# Patient Record
Sex: MALE | Race: WHITE | NOT HISPANIC OR LATINO | URBAN - METROPOLITAN AREA
[De-identification: names, ages, dates, MRNs, and addresses within clinical notes are randomized per-mention and may not be internally consistent; named-entity substitution may affect disease eponyms.]

---

## 2021-02-03 ENCOUNTER — OFFICE VISIT (OUTPATIENT)
Dept: URBAN - METROPOLITAN AREA CLINIC 70 | Facility: CLINIC | Age: 86
End: 2021-02-03
Payer: MEDICARE

## 2021-02-03 ENCOUNTER — WEB ENCOUNTER (OUTPATIENT)
Dept: URBAN - METROPOLITAN AREA CLINIC 70 | Facility: CLINIC | Age: 86
End: 2021-02-03

## 2021-02-03 VITALS
HEIGHT: 71 IN | BODY MASS INDEX: 25.2 KG/M2 | DIASTOLIC BLOOD PRESSURE: 97 MMHG | SYSTOLIC BLOOD PRESSURE: 188 MMHG | WEIGHT: 180 LBS | HEART RATE: 87 BPM | TEMPERATURE: 97 F

## 2021-02-03 DIAGNOSIS — R19.7 DIARRHEA: ICD-10-CM

## 2021-02-03 PROCEDURE — G9903 PT SCRN TBCO ID AS NON USER: HCPCS | Performed by: REGISTERED NURSE

## 2021-02-03 PROCEDURE — 1036F TOBACCO NON-USER: CPT | Performed by: REGISTERED NURSE

## 2021-02-03 PROCEDURE — G8427 DOCREV CUR MEDS BY ELIG CLIN: HCPCS | Performed by: REGISTERED NURSE

## 2021-02-03 PROCEDURE — G8420 CALC BMI NORM PARAMETERS: HCPCS | Performed by: REGISTERED NURSE

## 2021-02-03 PROCEDURE — 99214 OFFICE O/P EST MOD 30 MIN: CPT | Performed by: REGISTERED NURSE

## 2021-02-03 PROCEDURE — G8482 FLU IMMUNIZE ORDER/ADMIN: HCPCS | Performed by: REGISTERED NURSE

## 2021-02-03 RX ORDER — TAMSULOSIN HYDROCHLORIDE 0.4 MG/1
CAPSULE ORAL
Qty: 0 | Refills: 0 | Status: ACTIVE | COMMUNITY
Start: 1900-01-01

## 2021-02-03 RX ORDER — LISINOPRIL AND HYDROCHLOROTHIAZIDE TABLETS 20; 25 MG/1; MG/1
TABLET ORAL
Qty: 0 | Refills: 0 | Status: ACTIVE | COMMUNITY
Start: 1900-01-01

## 2021-02-03 RX ORDER — GABAPENTIN 300 MG/1
1 CAPSULE CAPSULE ORAL ONCE A DAY
Status: ACTIVE | COMMUNITY

## 2021-02-03 RX ORDER — TIMOLOL 5.12 MG/ML
SOLUTION/ DROPS OPHTHALMIC
Qty: 0 | Refills: 0 | Status: ACTIVE | COMMUNITY
Start: 1900-01-01

## 2021-02-03 RX ORDER — TRAMADOL HYDROCHLORIDE 50 MG/1
1 TABLET AS NEEDED TABLET, FILM COATED ORAL ONCE A DAY
Status: ACTIVE | COMMUNITY

## 2021-02-03 NOTE — HPI-TODAY'S VISIT:
Pt c/o diarrhea. Diarrhea has been present for 1 year. They have been having 6-8 BMs/day. Stool is watery. Associated symptoms are urgency. Workup has not been done. He was prescribe hyoscamine 2 weeks ago which has helped some. Still having 3-4 BMs/day. Colonoscopy was done 8/14/15 which was normal. Random biopsies were not done.

## 2021-02-05 ENCOUNTER — LAB OUTSIDE AN ENCOUNTER (OUTPATIENT)
Dept: URBAN - METROPOLITAN AREA CLINIC 70 | Facility: CLINIC | Age: 86
End: 2021-02-05

## 2021-02-07 LAB — C DIFFICILE TOXINS A+B, EIA: NEGATIVE

## 2021-02-13 LAB
C DIFFICILE TOXINS A+B, EIA: NEGATIVE
C-REACTIVE PROTEIN, QUANT: 30
CAMPYLOBACTER CULTURE: (no result)
CRYPTOSPORIDIUM EIA: NEGATIVE
E COLI SHIGA TOXIN EIA: NEGATIVE
ENDOMYSIAL ANTIBODY IGA: NEGATIVE
GIARDIA LAMBLIA AG, EIA: NEGATIVE
IMMUNOGLOBULIN A, QN, SERUM: 247
Lab: (no result)
OVA + PARASITE EXAM: (no result)
PANCREATIC ELASTASE, FECAL: 167
SALMONELLA/SHIGELLA SCREEN: (no result)
T-TRANSGLUTAMINASE (TTG) IGA: <2
T4,FREE(DIRECT): 1.02
TSH: 1.49
WHITE BLOOD CELLS (WBC), STOOL: (no result)

## 2021-02-24 ENCOUNTER — TELEPHONE ENCOUNTER (OUTPATIENT)
Dept: URBAN - METROPOLITAN AREA CLINIC 92 | Facility: CLINIC | Age: 86
End: 2021-02-24

## 2021-02-24 RX ORDER — PANCRELIPASE 24000; 76000; 120000 [USP'U]/1; [USP'U]/1; [USP'U]/1
AS DIRECTED CAPSULE, DELAYED RELEASE PELLETS ORAL
Qty: 240 | Refills: 2 | OUTPATIENT
Start: 2021-02-24 | End: 2021-05-25

## 2021-03-03 ENCOUNTER — OFFICE VISIT (OUTPATIENT)
Dept: URBAN - METROPOLITAN AREA CLINIC 70 | Facility: CLINIC | Age: 86
End: 2021-03-03
Payer: MEDICARE

## 2021-03-03 ENCOUNTER — WEB ENCOUNTER (OUTPATIENT)
Dept: URBAN - METROPOLITAN AREA CLINIC 70 | Facility: CLINIC | Age: 86
End: 2021-03-03

## 2021-03-03 VITALS
BODY MASS INDEX: 173 KG/M2 | HEART RATE: 90 BPM | TEMPERATURE: 96.6 F | SYSTOLIC BLOOD PRESSURE: 105 MMHG | DIASTOLIC BLOOD PRESSURE: 67 MMHG | HEIGHT: 71 IN

## 2021-03-03 DIAGNOSIS — K86.81 EXOCRINE PANCREATIC INSUFFICIENCY: ICD-10-CM

## 2021-03-03 PROCEDURE — 99213 OFFICE O/P EST LOW 20 MIN: CPT | Performed by: REGISTERED NURSE

## 2021-03-03 RX ORDER — LISINOPRIL AND HYDROCHLOROTHIAZIDE TABLETS 20; 25 MG/1; MG/1
TABLET ORAL
Qty: 0 | Refills: 0 | Status: ACTIVE | COMMUNITY
Start: 1900-01-01

## 2021-03-03 RX ORDER — GABAPENTIN 300 MG/1
1 CAPSULE CAPSULE ORAL ONCE A DAY
Status: ACTIVE | COMMUNITY

## 2021-03-03 RX ORDER — TAMSULOSIN HYDROCHLORIDE 0.4 MG/1
CAPSULE ORAL
Qty: 0 | Refills: 0 | Status: ACTIVE | COMMUNITY
Start: 1900-01-01

## 2021-03-03 RX ORDER — PANCRELIPASE 24000; 76000; 120000 [USP'U]/1; [USP'U]/1; [USP'U]/1
AS DIRECTED CAPSULE, DELAYED RELEASE PELLETS ORAL
Qty: 240 | Refills: 2 | Status: ACTIVE | COMMUNITY
Start: 2021-02-24 | End: 2021-05-25

## 2021-03-03 RX ORDER — PANCRELIPASE 24000; 76000; 120000 [USP'U]/1; [USP'U]/1; [USP'U]/1
AS DIRECTED CAPSULE, DELAYED RELEASE PELLETS ORAL
Qty: 240 CAPSULE | Refills: 11 | OUTPATIENT
Start: 2021-02-24 | End: 2021-05-25

## 2021-03-03 RX ORDER — TRAMADOL HYDROCHLORIDE 50 MG/1
1 TABLET AS NEEDED TABLET, FILM COATED ORAL ONCE A DAY
Status: ACTIVE | COMMUNITY

## 2021-03-03 RX ORDER — TIMOLOL 5.12 MG/ML
SOLUTION/ DROPS OPHTHALMIC
Qty: 0 | Refills: 0 | Status: ACTIVE | COMMUNITY
Start: 1900-01-01

## 2021-03-03 RX ORDER — FINASTERIDE 5 MG/1
1 TABLET TABLET, FILM COATED ORAL ONCE A DAY
Status: ACTIVE | COMMUNITY

## 2021-03-03 NOTE — HPI-TODAY'S VISIT:
Note from OV 2/3/21: Pt c/o diarrhea. Diarrhea has been present for 1 year. They have been having 6-8 BMs/day. Stool is watery. Associated symptoms are urgency. Workup has not been done. He was prescribe hyoscamine 2 weeks ago which has helped some. Still having 3-4 BMs/day. Colonoscopy was done 8/14/15 which was normal. Random biopsies were not done. TODAY: Stool studies were negative for infectious process but fecal elastase was low. Rx for Creon sent to pharmacy. Diarrhea has resolved after starting Creon. He is feeling much better.

## 2021-10-04 ENCOUNTER — OFFICE VISIT (OUTPATIENT)
Dept: URBAN - METROPOLITAN AREA CLINIC 70 | Facility: CLINIC | Age: 86
End: 2021-10-04
Payer: MEDICARE

## 2021-10-04 ENCOUNTER — WEB ENCOUNTER (OUTPATIENT)
Dept: URBAN - METROPOLITAN AREA CLINIC 70 | Facility: CLINIC | Age: 86
End: 2021-10-04

## 2021-10-04 VITALS
HEIGHT: 71 IN | WEIGHT: 167.2 LBS | SYSTOLIC BLOOD PRESSURE: 162 MMHG | DIASTOLIC BLOOD PRESSURE: 89 MMHG | TEMPERATURE: 97.9 F | HEART RATE: 108 BPM | BODY MASS INDEX: 23.41 KG/M2

## 2021-10-04 DIAGNOSIS — A04.9 BACTERIAL INTESTINAL INFECTION, UNSPECIFIED: ICD-10-CM

## 2021-10-04 DIAGNOSIS — K86.81 EXOCRINE PANCREATIC INSUFFICIENCY: ICD-10-CM

## 2021-10-04 PROCEDURE — 99214 OFFICE O/P EST MOD 30 MIN: CPT | Performed by: INTERNAL MEDICINE

## 2021-10-04 RX ORDER — ESCITALOPRAM OXALATE 10 MG/1
TABLET, FILM COATED ORAL
Qty: 30 | Status: ACTIVE | COMMUNITY

## 2021-10-04 RX ORDER — LISINOPRIL AND HYDROCHLOROTHIAZIDE TABLETS 20; 25 MG/1; MG/1
TABLET ORAL
Qty: 0 | Refills: 0 | Status: ACTIVE | COMMUNITY
Start: 1900-01-01

## 2021-10-04 RX ORDER — PANCRELIPASE 36000; 180000; 114000 [USP'U]/1; [USP'U]/1; [USP'U]/1
AS DIRECTED CAPSULE, DELAYED RELEASE PELLETS ORAL
Qty: 250 | Refills: 3 | OUTPATIENT
Start: 2021-10-04 | End: 2022-01-31

## 2021-10-04 RX ORDER — GABAPENTIN 300 MG/1
1 CAPSULE CAPSULE ORAL ONCE A DAY
Status: ACTIVE | COMMUNITY

## 2021-10-04 RX ORDER — TRAMADOL HYDROCHLORIDE 50 MG/1
1 TABLET AS NEEDED TABLET, FILM COATED ORAL ONCE A DAY
Status: ACTIVE | COMMUNITY

## 2021-10-04 RX ORDER — TAMSULOSIN HYDROCHLORIDE 0.4 MG/1
CAPSULE ORAL
Qty: 0 | Refills: 0 | Status: ACTIVE | COMMUNITY
Start: 1900-01-01

## 2021-10-04 RX ORDER — TIMOLOL MALEATE 5 MG/ML
SOLUTION/ DROPS OPHTHALMIC
Qty: 5 | Status: ACTIVE | COMMUNITY

## 2021-10-04 RX ORDER — HYOSCYAMINE SULFATE 0.38 MG/1
TAKE 1 TABLET TWICE A DAY FOR CHRONIC DIARRHEA DIAGNOSIS UNAVAILABLE TABLET ORAL
Qty: 60 | Refills: 0 | Status: ACTIVE | COMMUNITY

## 2021-10-04 RX ORDER — FINASTERIDE 5 MG/1
1 TABLET TABLET, FILM COATED ORAL ONCE A DAY
Status: ACTIVE | COMMUNITY

## 2021-10-04 RX ORDER — TIMOLOL 5.12 MG/ML
SOLUTION/ DROPS OPHTHALMIC
Qty: 0 | Refills: 0 | Status: ACTIVE | COMMUNITY
Start: 1900-01-01

## 2021-10-04 NOTE — HPI-TODAY'S VISIT:
OV 2/3/21: Pt c/o diarrhea. Diarrhea has been present for 1 year. They have been having 6-8 BMs/day. Stool is watery. Associated symptoms are urgency. Workup has not been done. He was prescribe hyoscamine 2 weeks ago which has helped some. Still having 3-4 BMs/day. Colonoscopy was done 8/14/15 which was normal. Random biopsies were not done. ------------------------------------------------------ OV 3/3/21: Stool studies were negative for infectious process but fecal elastase was low. Rx for Creon sent to pharmacy. Diarrhea has resolved after starting Creon. He is feeling much better. ------------------------------------------------------- Today 10/4/21: Patient presents today with c/o recurrent diarrhea. BMs x 5-6/day with watery non-bloody stool. Diarrhea has been presents for the last several weeks. He states he ran out of his creon and has been off medication for ~1 month. No recent antibiotics or ill contacts. Denies fever, abdominal pain, N/V, constipation, or rectal bleeding. Last colon 2015 with normal findings.
no diabetes and no thyroid trouble.

## 2021-10-07 ENCOUNTER — TELEPHONE ENCOUNTER (OUTPATIENT)
Dept: URBAN - METROPOLITAN AREA CLINIC 70 | Facility: CLINIC | Age: 86
End: 2021-10-07

## 2021-10-07 RX ORDER — PANCRELIPASE 36000; 180000; 114000 [USP'U]/1; [USP'U]/1; [USP'U]/1
AS DIRECTED CAPSULE, DELAYED RELEASE PELLETS ORAL
Qty: 250 | Refills: 3
Start: 2021-10-04 | End: 2022-02-04

## 2021-11-12 ENCOUNTER — DASHBOARD ENCOUNTERS (OUTPATIENT)
Age: 86
End: 2021-11-12

## 2021-11-15 ENCOUNTER — OFFICE VISIT (OUTPATIENT)
Dept: URBAN - METROPOLITAN AREA CLINIC 70 | Facility: CLINIC | Age: 86
End: 2021-11-15

## 2021-11-15 RX ORDER — TIMOLOL 5.12 MG/ML
SOLUTION/ DROPS OPHTHALMIC
Qty: 0 | Refills: 0 | COMMUNITY
Start: 1900-01-01

## 2021-11-15 RX ORDER — LISINOPRIL AND HYDROCHLOROTHIAZIDE TABLETS 20; 25 MG/1; MG/1
TABLET ORAL
Qty: 0 | Refills: 0 | COMMUNITY
Start: 1900-01-01

## 2021-11-15 RX ORDER — HYOSCYAMINE SULFATE 0.38 MG/1
TAKE 1 TABLET TWICE A DAY FOR CHRONIC DIARRHEA DIAGNOSIS UNAVAILABLE TABLET ORAL
Qty: 60 | Refills: 0 | COMMUNITY

## 2021-11-15 RX ORDER — GABAPENTIN 300 MG/1
1 CAPSULE CAPSULE ORAL ONCE A DAY
COMMUNITY

## 2021-11-15 RX ORDER — PANCRELIPASE 36000; 180000; 114000 [USP'U]/1; [USP'U]/1; [USP'U]/1
AS DIRECTED CAPSULE, DELAYED RELEASE PELLETS ORAL
Qty: 250 | Refills: 3 | COMMUNITY
Start: 2021-10-04 | End: 2022-02-04

## 2021-11-15 RX ORDER — TRAMADOL HYDROCHLORIDE 50 MG/1
1 TABLET AS NEEDED TABLET, FILM COATED ORAL ONCE A DAY
COMMUNITY

## 2021-11-15 RX ORDER — TIMOLOL MALEATE 5 MG/ML
SOLUTION/ DROPS OPHTHALMIC
Qty: 5 | COMMUNITY

## 2021-11-15 RX ORDER — TAMSULOSIN HYDROCHLORIDE 0.4 MG/1
CAPSULE ORAL
Qty: 0 | Refills: 0 | COMMUNITY
Start: 1900-01-01

## 2021-11-15 RX ORDER — ESCITALOPRAM OXALATE 10 MG/1
TABLET, FILM COATED ORAL
Qty: 30 | COMMUNITY

## 2021-11-15 RX ORDER — FINASTERIDE 5 MG/1
1 TABLET TABLET, FILM COATED ORAL ONCE A DAY
COMMUNITY